# Patient Record
Sex: MALE | Race: ASIAN | NOT HISPANIC OR LATINO | URBAN - METROPOLITAN AREA
[De-identification: names, ages, dates, MRNs, and addresses within clinical notes are randomized per-mention and may not be internally consistent; named-entity substitution may affect disease eponyms.]

---

## 2021-07-27 ENCOUNTER — OFFICE VISIT (OUTPATIENT)
Dept: URBAN - METROPOLITAN AREA CLINIC 35 | Facility: CLINIC | Age: 56
End: 2021-07-27

## 2021-07-27 ENCOUNTER — DASHBOARD ENCOUNTERS (OUTPATIENT)
Age: 56
End: 2021-07-27

## 2021-07-27 VITALS
OXYGEN SATURATION: 96 % | WEIGHT: 203 LBS | BODY MASS INDEX: 29.06 KG/M2 | DIASTOLIC BLOOD PRESSURE: 78 MMHG | SYSTOLIC BLOOD PRESSURE: 128 MMHG | HEIGHT: 70 IN | HEART RATE: 85 BPM

## 2021-07-27 RX ORDER — ALBUTEROL SULFATE 90 UG/1
2 PUFFS AS NEEDED AEROSOL, METERED RESPIRATORY (INHALATION)
Status: ON HOLD | COMMUNITY

## 2021-07-27 RX ORDER — TEMAZEPAM 30 MG/1
1 CAPSULE AT BEDTIME AS NEEDED CAPSULE ORAL PRN
Status: ON HOLD | COMMUNITY
Start: 2021-02-17

## 2021-07-27 RX ORDER — SULFAMETHOXAZOLE AND TRIMETHOPRIM 800; 160 MG/1; MG/1
1 TABLET TABLET ORAL ONCE DAILY
Status: ON HOLD | COMMUNITY
Start: 2021-02-17

## 2021-07-27 RX ORDER — SACUBITRIL AND VALSARTAN 49; 51 MG/1; MG/1
1 TABLET TABLET, FILM COATED ORAL ONCE DAILY
Status: ACTIVE | COMMUNITY

## 2021-07-27 RX ORDER — AZITHROMYCIN 250 MG/1
AS DIRECTED TABLET, FILM COATED ORAL
Status: ON HOLD | COMMUNITY
Start: 2021-06-02

## 2021-07-27 RX ORDER — ZOLPIDEM TARTRATE 10 MG/1
1/2 - 1 TABLET AT BEDTIME AS NEEDED TABLET, FILM COATED ORAL
Status: ACTIVE | COMMUNITY

## 2021-07-27 RX ORDER — SILDENAFIL CITRATE 100 MG/1
1/2  TABLET TABLET, FILM COATED ORAL PRN
Status: ON HOLD | COMMUNITY

## 2021-07-27 RX ORDER — CLOBETASOL PROPIONATE 0.5 MG/G
1 APPLICATION OINTMENT TOPICAL
Status: ACTIVE | COMMUNITY
Start: 2021-05-14

## 2021-07-27 RX ORDER — ROSUVASTATIN CALCIUM 20 MG/1
1 TABLET TABLET, FILM COATED ORAL ONCE A DAY
Status: ACTIVE | COMMUNITY

## 2021-07-27 RX ORDER — SPIRONOLACTONE 25 MG/1
1 TABLET TABLET, FILM COATED ORAL
Qty: 15 | Status: ACTIVE | COMMUNITY

## 2021-07-27 RX ORDER — VALACYCLOVIR HYDROCHLORIDE 1 G/1
1 TABLET TABLET, FILM COATED ORAL ONCE A DAY
Status: ON HOLD | COMMUNITY

## 2021-07-27 RX ORDER — ALPRAZOLAM 0.5 MG/1
1/2 TO 1 TABLET TABLET ORAL
Status: ON HOLD | COMMUNITY
Start: 2021-02-17

## 2021-07-27 RX ORDER — SODIUM SULFATE, POTASSIUM SULFATE, MAGNESIUM SULFATE 17.5; 3.13; 1.6 G/ML; G/ML; G/ML
AS DIRECTED SOLUTION, CONCENTRATE ORAL
Qty: 1 | Refills: 0 | Status: ON HOLD | COMMUNITY
Start: 2017-03-01

## 2021-07-27 RX ORDER — MUPIROCIN 20 MG/G
1 APPLICATION OINTMENT TOPICAL TWICE A DAY
Status: ON HOLD | COMMUNITY
Start: 2021-06-02

## 2021-07-27 RX ORDER — ASPIRIN 81 MG/1
1 TABLET TABLET, COATED ORAL ONCE A DAY
Status: ON HOLD | COMMUNITY

## 2021-07-27 RX ORDER — DULOXETINE HYDROCHLORIDE 30 MG/1
3 CAPSULES CAPSULE, DELAYED RELEASE ORAL ONCE A DAY
Status: ON HOLD | COMMUNITY
Start: 2021-04-28

## 2021-07-27 RX ORDER — TURMERIC ROOT EXTRACT 500 MG
1 TABLET TABLET ORAL ONCE DAILY
Status: ON HOLD | COMMUNITY

## 2021-07-27 RX ORDER — CARVEDILOL 25 MG/1
1 TABLET WITH FOOD TABLET, FILM COATED ORAL TWICE A DAY
Status: ACTIVE | COMMUNITY

## 2021-07-27 RX ORDER — METFORMIN HYDROCHLORIDE 500 MG/1
1 TABLET WITH A MEAL TABLET, FILM COATED ORAL TWICE DAILY
Status: ACTIVE | COMMUNITY

## 2021-07-27 RX ORDER — FLUTICASONE PROPIONATE 50 UG/1
1 SPRAY IN EACH NOSTRIL SPRAY, METERED NASAL ONCE A DAY
Status: ON HOLD | COMMUNITY

## 2021-07-27 RX ORDER — EMPAGLIFLOZIN 10 MG/1
1 TABLET TABLET, FILM COATED ORAL ONCE A DAY
Status: ACTIVE | COMMUNITY

## 2021-07-27 RX ORDER — DULAGLUTIDE 1.5 MG/.5ML
1.5 MG INJECTION, SOLUTION SUBCUTANEOUS
Status: ON HOLD | COMMUNITY
Start: 2021-04-19

## 2021-07-27 RX ORDER — SODIUM, POTASSIUM,MAG SULFATES 17.5-3.13G
ML AS DIRECTED SOLUTION, RECONSTITUTED, ORAL ORAL
Qty: 1 KIT | Refills: 0 | OUTPATIENT
Start: 2021-07-27

## 2021-07-27 RX ORDER — CITALOPRAM HYDROBROMIDE 20 MG/1
1 TABLET TABLET, FILM COATED ORAL ONCE A DAY
Status: ON HOLD | COMMUNITY
Start: 2021-01-05

## 2021-07-27 RX ORDER — LEVOTHYROXINE SODIUM 150 UG/1
1.15MG 1 CAPSULE CAPSULE ORAL ONCE A DAY
Status: ON HOLD | COMMUNITY

## 2021-07-27 RX ORDER — NITROGLYCERIN 0.3 MG/1
AS DIRECTED TABLET SUBLINGUAL
Status: ACTIVE | COMMUNITY

## 2021-07-27 RX ORDER — KRILL/OM-3/DHA/EPA/PHOSPHO/AST 1000-230MG
1 TABLET CAPSULE ORAL ONCE A DAY
Status: ACTIVE | COMMUNITY

## 2021-07-27 RX ORDER — PERMETHRIN 50 MG/G
1 APPLICATION CREAM TOPICAL
Status: ON HOLD | COMMUNITY
Start: 2021-06-02

## 2021-07-27 RX ORDER — ELECTROLYTES/DEXTROSE
SOLUTION, ORAL ORAL
Status: ON HOLD | COMMUNITY

## 2021-07-27 RX ORDER — PHENAZOPYRIDINE 200 MG/1
1 TABLET TABLET, FILM COATED ORAL TWICE A DAY
Status: ON HOLD | COMMUNITY
Start: 2021-04-02

## 2021-07-27 RX ORDER — B-COMPLEX WITH VITAMIN C
TABLET ORAL
Status: ON HOLD | COMMUNITY

## 2021-07-27 NOTE — HPI-MIGRATED HPI
;     Personal History of Colon/Rectal Polyp : 56 year old male who presents today for consultation for a surveillance colonoscopy. He has a personal history of colonic polyps and his last colonoscopy was 03/17/2017) revealing rectal polyp pathoilogy noting Hyperplastic, Transverse colon polyp-Tubular adenoma, Sigmoid colon polyp-Tubular adenoma.   Patient admits/denies a family history of colon polyps and cancers.  Patient currently admits 2-3 bowel movements a day without strain. Stools are formed. Patient denies any blood, mucus, or melena. He denies any rectal pain or pruritus ani.   He admits he experience acid reflux with spicy foods or if he eats late at night. Patient reports that he will take Tums in order to control his symptoms.   Last visit (03/30/2017) Patient present today for follow up to his colonoscopy. Patient denies any changes in his bowel habits since the procedure. Patient denies any complication post procedure.;

## 2021-08-26 ENCOUNTER — TELEPHONE ENCOUNTER (OUTPATIENT)
Dept: URBAN - METROPOLITAN AREA CLINIC 35 | Facility: CLINIC | Age: 56
End: 2021-08-26

## 2021-08-30 ENCOUNTER — OFFICE VISIT (OUTPATIENT)
Dept: URBAN - METROPOLITAN AREA MEDICAL CENTER 27 | Facility: MEDICAL CENTER | Age: 56
End: 2021-08-30

## 2021-09-14 ENCOUNTER — OFFICE VISIT (OUTPATIENT)
Dept: URBAN - METROPOLITAN AREA CLINIC 35 | Facility: CLINIC | Age: 56
End: 2021-09-14

## 2021-09-17 ENCOUNTER — TELEPHONE ENCOUNTER (OUTPATIENT)
Dept: URBAN - METROPOLITAN AREA CLINIC 35 | Facility: CLINIC | Age: 56
End: 2021-09-17

## 2021-09-27 ENCOUNTER — OFFICE VISIT (OUTPATIENT)
Dept: URBAN - METROPOLITAN AREA MEDICAL CENTER 27 | Facility: MEDICAL CENTER | Age: 56
End: 2021-09-27

## 2021-10-04 ENCOUNTER — OFFICE VISIT (OUTPATIENT)
Dept: URBAN - METROPOLITAN AREA MEDICAL CENTER 27 | Facility: MEDICAL CENTER | Age: 56
End: 2021-10-04

## 2021-10-12 ENCOUNTER — OFFICE VISIT (OUTPATIENT)
Dept: URBAN - METROPOLITAN AREA CLINIC 35 | Facility: CLINIC | Age: 56
End: 2021-10-12

## 2021-10-26 ENCOUNTER — OFFICE VISIT (OUTPATIENT)
Dept: URBAN - METROPOLITAN AREA CLINIC 31 | Facility: CLINIC | Age: 56
End: 2021-10-26

## 2021-10-28 ENCOUNTER — TELEPHONE ENCOUNTER (OUTPATIENT)
Dept: URBAN - METROPOLITAN AREA CLINIC 35 | Facility: CLINIC | Age: 56
End: 2021-10-28

## 2021-11-01 ENCOUNTER — OFFICE VISIT (OUTPATIENT)
Dept: URBAN - METROPOLITAN AREA CLINIC 33 | Facility: CLINIC | Age: 56
End: 2021-11-01

## 2021-11-01 RX ORDER — CLOBETASOL PROPIONATE 0.5 MG/G
1 APPLICATION OINTMENT TOPICAL
Status: ACTIVE | COMMUNITY
Start: 2021-05-14

## 2021-11-01 RX ORDER — B-COMPLEX WITH VITAMIN C
TABLET ORAL
Status: ON HOLD | COMMUNITY

## 2021-11-01 RX ORDER — CARVEDILOL 25 MG/1
1 TABLET WITH FOOD TABLET, FILM COATED ORAL TWICE A DAY
Status: ACTIVE | COMMUNITY

## 2021-11-01 RX ORDER — SACUBITRIL AND VALSARTAN 49; 51 MG/1; MG/1
1 TABLET TABLET, FILM COATED ORAL ONCE DAILY
Status: ACTIVE | COMMUNITY

## 2021-11-01 RX ORDER — PERMETHRIN 50 MG/G
1 APPLICATION CREAM TOPICAL
Status: ON HOLD | COMMUNITY
Start: 2021-06-02

## 2021-11-01 RX ORDER — ASPIRIN 81 MG/1
1 TABLET TABLET, COATED ORAL ONCE A DAY
Status: ON HOLD | COMMUNITY

## 2021-11-01 RX ORDER — LEVOTHYROXINE SODIUM 150 UG/1
1.15MG 1 CAPSULE CAPSULE ORAL ONCE A DAY
Status: ON HOLD | COMMUNITY

## 2021-11-01 RX ORDER — DULAGLUTIDE 1.5 MG/.5ML
1.5 MG INJECTION, SOLUTION SUBCUTANEOUS
Status: ON HOLD | COMMUNITY
Start: 2021-04-19

## 2021-11-01 RX ORDER — ZOLPIDEM TARTRATE 10 MG/1
1/2 - 1 TABLET AT BEDTIME AS NEEDED TABLET, FILM COATED ORAL
Status: ACTIVE | COMMUNITY

## 2021-11-01 RX ORDER — SODIUM, POTASSIUM,MAG SULFATES 17.5-3.13G
ML AS DIRECTED SOLUTION, RECONSTITUTED, ORAL ORAL
Qty: 1 KIT | Refills: 0 | Status: ACTIVE | COMMUNITY
Start: 2021-07-27

## 2021-11-01 RX ORDER — ALBUTEROL SULFATE 90 UG/1
2 PUFFS AS NEEDED AEROSOL, METERED RESPIRATORY (INHALATION)
Status: ON HOLD | COMMUNITY

## 2021-11-01 RX ORDER — CITALOPRAM HYDROBROMIDE 20 MG/1
1 TABLET TABLET, FILM COATED ORAL ONCE A DAY
Status: ON HOLD | COMMUNITY
Start: 2021-01-05

## 2021-11-01 RX ORDER — SULFAMETHOXAZOLE AND TRIMETHOPRIM 800; 160 MG/1; MG/1
1 TABLET TABLET ORAL ONCE DAILY
Status: ON HOLD | COMMUNITY
Start: 2021-02-17

## 2021-11-01 RX ORDER — MUPIROCIN 20 MG/G
1 APPLICATION OINTMENT TOPICAL TWICE A DAY
Status: ON HOLD | COMMUNITY
Start: 2021-06-02

## 2021-11-01 RX ORDER — ALPRAZOLAM 0.5 MG/1
1/2 TO 1 TABLET TABLET ORAL
Status: ON HOLD | COMMUNITY
Start: 2021-02-17

## 2021-11-01 RX ORDER — NITROGLYCERIN 0.3 MG/1
AS DIRECTED TABLET SUBLINGUAL
Status: ACTIVE | COMMUNITY

## 2021-11-01 RX ORDER — SILDENAFIL CITRATE 100 MG/1
1/2  TABLET TABLET, FILM COATED ORAL PRN
Status: ON HOLD | COMMUNITY

## 2021-11-01 RX ORDER — TURMERIC ROOT EXTRACT 500 MG
1 TABLET TABLET ORAL ONCE DAILY
Status: ON HOLD | COMMUNITY

## 2021-11-01 RX ORDER — KRILL/OM-3/DHA/EPA/PHOSPHO/AST 1000-230MG
1 TABLET CAPSULE ORAL ONCE A DAY
Status: ACTIVE | COMMUNITY

## 2021-11-01 RX ORDER — AZITHROMYCIN 250 MG/1
AS DIRECTED TABLET, FILM COATED ORAL
Status: ON HOLD | COMMUNITY
Start: 2021-06-02

## 2021-11-01 RX ORDER — SPIRONOLACTONE 25 MG/1
1 TABLET TABLET, FILM COATED ORAL
Qty: 15 | Status: ACTIVE | COMMUNITY

## 2021-11-01 RX ORDER — ELECTROLYTES/DEXTROSE
SOLUTION, ORAL ORAL
Status: ON HOLD | COMMUNITY

## 2021-11-01 RX ORDER — ROSUVASTATIN CALCIUM 20 MG/1
1 TABLET TABLET, FILM COATED ORAL ONCE A DAY
Status: ACTIVE | COMMUNITY

## 2021-11-01 RX ORDER — DULOXETINE HYDROCHLORIDE 30 MG/1
3 CAPSULES CAPSULE, DELAYED RELEASE ORAL ONCE A DAY
Status: ON HOLD | COMMUNITY
Start: 2021-04-28

## 2021-11-01 RX ORDER — FLUTICASONE PROPIONATE 50 UG/1
1 SPRAY IN EACH NOSTRIL SPRAY, METERED NASAL ONCE A DAY
Status: ON HOLD | COMMUNITY

## 2021-11-01 RX ORDER — METFORMIN HYDROCHLORIDE 500 MG/1
1 TABLET WITH A MEAL TABLET, FILM COATED ORAL TWICE DAILY
Status: ACTIVE | COMMUNITY

## 2021-11-01 RX ORDER — VALACYCLOVIR HYDROCHLORIDE 1 G/1
1 TABLET TABLET, FILM COATED ORAL ONCE A DAY
Status: ON HOLD | COMMUNITY

## 2021-11-01 RX ORDER — SODIUM SULFATE, POTASSIUM SULFATE, MAGNESIUM SULFATE 17.5; 3.13; 1.6 G/ML; G/ML; G/ML
AS DIRECTED SOLUTION, CONCENTRATE ORAL
Qty: 1 | Refills: 0 | Status: ON HOLD | COMMUNITY
Start: 2017-03-01

## 2021-11-01 RX ORDER — PHENAZOPYRIDINE 200 MG/1
1 TABLET TABLET, FILM COATED ORAL TWICE A DAY
Status: ON HOLD | COMMUNITY
Start: 2021-04-02

## 2021-11-01 RX ORDER — EMPAGLIFLOZIN 10 MG/1
1 TABLET TABLET, FILM COATED ORAL ONCE A DAY
Status: ACTIVE | COMMUNITY

## 2021-11-01 RX ORDER — TEMAZEPAM 30 MG/1
1 CAPSULE AT BEDTIME AS NEEDED CAPSULE ORAL PRN
Status: ON HOLD | COMMUNITY
Start: 2021-02-17